# Patient Record
Sex: MALE
[De-identification: names, ages, dates, MRNs, and addresses within clinical notes are randomized per-mention and may not be internally consistent; named-entity substitution may affect disease eponyms.]

---

## 2020-04-20 ENCOUNTER — HOSPITAL ENCOUNTER (EMERGENCY)
Dept: HOSPITAL 92 - ERS | Age: 25
Discharge: HOME | End: 2020-04-20
Payer: SELF-PAY

## 2020-04-20 DIAGNOSIS — F41.9: ICD-10-CM

## 2020-04-20 DIAGNOSIS — S01.81XA: ICD-10-CM

## 2020-04-20 DIAGNOSIS — S01.01XA: Primary | ICD-10-CM

## 2020-04-20 DIAGNOSIS — G93.0: ICD-10-CM

## 2020-04-20 DIAGNOSIS — F32.9: ICD-10-CM
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
## 2020-04-20 PROCEDURE — 70450 CT HEAD/BRAIN W/O DYE: CPT

## 2020-04-20 PROCEDURE — 90715 TDAP VACCINE 7 YRS/> IM: CPT

## 2020-04-20 PROCEDURE — 12002 RPR S/N/AX/GEN/TRNK2.6-7.5CM: CPT

## 2020-04-20 PROCEDURE — 90471 IMMUNIZATION ADMIN: CPT

## 2020-04-20 NOTE — CT
CT HEAD WITHOUT IV CONTRAST



COMPARISON:

 None



HISTORY:

 Injury to head. Patient has laceration to scalp.



TECHNIQUE: Axial CT imaging at 5 mm intervals from vertex through skull base without contrast



FINDINGS:

A 1.4 cm hypodense cystic appearing lesion is seen in the region of the expected location of the pine
al gland and is likely attributable to a pineal cyst. However follow-up evaluation with MRI of

brain is recommended with and without IV contrast. There is no evidence of an acute infarction, hemor
rhage, mass effect, or midline shift. The ventricular system is normal in size, shape, and

position. 



Minimal mucosal thickening is present in the right sphenoid sinus. Mastoid air cells are clear.



Osseous structures appear intact.No calvarial fracture is seen.



IMPRESSION:

1. No acute intracranial abnormality demonstrated.

2. Hypodense cystic appearing lesion in the region of the pineal gland which is most likely attributa
ble to a pineal cyst. However, follow-up MRI brain with and without IV contrast is recommended for

further evaluation

3. Above findings and recommendations were discussed Dr. Sanches in the emergency department on 4/20/2
020 at 0952 hours.



Reported By: Mirza Rosado 

Electronically Signed:  4/20/2020 9:56 AM

## 2020-08-28 ENCOUNTER — HOSPITAL ENCOUNTER (OUTPATIENT)
Dept: HOSPITAL 92 - SCSMRI | Age: 25
Discharge: HOME | End: 2020-08-28
Attending: FAMILY MEDICINE
Payer: COMMERCIAL

## 2020-08-28 DIAGNOSIS — G93.0: Primary | ICD-10-CM

## 2020-08-28 DIAGNOSIS — E34.8: ICD-10-CM

## 2020-08-28 PROCEDURE — 70553 MRI BRAIN STEM W/O & W/DYE: CPT

## 2020-08-28 NOTE — MRI
Exam: Brain MRI with and without contrast



HISTORY: Pineal cyst



COMPARISON: None



FINDINGS:

Gradient echo sequence: No hemorrhage

Calvarium: Appropriate T1 marrow signal intensity

Midline brain parenchyma: Unremarkable

Cerebrum:No parenchymal mass, mass effect or midline shift. Brain volume, age-appropriate. Cortical g
ray-white matter differentiation is preserved. No significant T2 or FLAIR white matter

hyperintensities.

Pineal gland: There is T2 hyperintensity with associated FLAIR hyperintensity involving the expanded 
pineal gland. Postcontrast images demonstrate incomplete peripheral enhancement. A pineal cyst is

favored. There is some mass effect upon the superior colliculi. There is no evidence of hydrocephalus
.

Ventricles: No evidence of hydrocephalus.

Sinuses and mastoid air cells: Mucous retention cyst in the right sphenoid sinus and right maxillary 
sinus

Diffusion: Central arterial flow is maintained. Absent restricted diffusion.

Postcontrast images: No pathologic enhancement of the brain parenchyma.



IMPRESSION:

Peripherally enhancing cystic lesion occupying the pineal gland, measuring 1.0 x 1.0 x 0.8 cm. Pineal
 cyst is favored. Correlation made with head CT 8/1/2019 does not demonstrate any appreciable

change. As a conservative measure, follow-up MRI can be performed.



Reported By: Usha Ryan 

Electronically Signed:  8/29/2020 11:18 AM